# Patient Record
Sex: FEMALE | Race: WHITE | NOT HISPANIC OR LATINO | URBAN - METROPOLITAN AREA
[De-identification: names, ages, dates, MRNs, and addresses within clinical notes are randomized per-mention and may not be internally consistent; named-entity substitution may affect disease eponyms.]

---

## 2018-12-25 ENCOUNTER — INPATIENT (INPATIENT)
Facility: HOSPITAL | Age: 83
LOS: 2 days | Discharge: HOME | DRG: 554 | End: 2018-12-28
Attending: INTERNAL MEDICINE | Admitting: INTERNAL MEDICINE
Payer: MEDICARE

## 2018-12-25 VITALS
HEIGHT: 59 IN | RESPIRATION RATE: 18 BRPM | TEMPERATURE: 98 F | OXYGEN SATURATION: 98 % | SYSTOLIC BLOOD PRESSURE: 107 MMHG | DIASTOLIC BLOOD PRESSURE: 72 MMHG | WEIGHT: 115.08 LBS | HEART RATE: 83 BPM

## 2018-12-25 DIAGNOSIS — R53.1 WEAKNESS: ICD-10-CM

## 2018-12-25 LAB
ALBUMIN SERPL ELPH-MCNC: 4.2 G/DL — SIGNIFICANT CHANGE UP (ref 3.3–5.2)
ALP SERPL-CCNC: 86 U/L — SIGNIFICANT CHANGE UP (ref 40–120)
ALT FLD-CCNC: 16 U/L — SIGNIFICANT CHANGE UP
ANION GAP SERPL CALC-SCNC: 13 MMOL/L — SIGNIFICANT CHANGE UP (ref 5–17)
APPEARANCE UR: ABNORMAL
APTT BLD: 25.1 SEC — LOW (ref 27.5–36.3)
AST SERPL-CCNC: 22 U/L — SIGNIFICANT CHANGE UP
BACTERIA # UR AUTO: ABNORMAL
BASOPHILS # BLD AUTO: 0 K/UL — SIGNIFICANT CHANGE UP (ref 0–0.2)
BASOPHILS NFR BLD AUTO: 0.2 % — SIGNIFICANT CHANGE UP (ref 0–2)
BILIRUB SERPL-MCNC: 0.3 MG/DL — LOW (ref 0.4–2)
BILIRUB UR-MCNC: NEGATIVE — SIGNIFICANT CHANGE UP
BLD GP AB SCN SERPL QL: SIGNIFICANT CHANGE UP
BUN SERPL-MCNC: 28 MG/DL — HIGH (ref 8–20)
CALCIUM SERPL-MCNC: 9.3 MG/DL — SIGNIFICANT CHANGE UP (ref 8.6–10.2)
CHLORIDE SERPL-SCNC: 104 MMOL/L — SIGNIFICANT CHANGE UP (ref 98–107)
CO2 SERPL-SCNC: 24 MMOL/L — SIGNIFICANT CHANGE UP (ref 22–29)
COLOR SPEC: YELLOW — SIGNIFICANT CHANGE UP
CREAT SERPL-MCNC: 0.64 MG/DL — SIGNIFICANT CHANGE UP (ref 0.5–1.3)
DIFF PNL FLD: ABNORMAL
EOSINOPHIL # BLD AUTO: 0.1 K/UL — SIGNIFICANT CHANGE UP (ref 0–0.5)
EOSINOPHIL NFR BLD AUTO: 1.5 % — SIGNIFICANT CHANGE UP (ref 0–6)
EPI CELLS # UR: ABNORMAL
GLUCOSE SERPL-MCNC: 108 MG/DL — SIGNIFICANT CHANGE UP (ref 70–115)
GLUCOSE UR QL: NEGATIVE MG/DL — SIGNIFICANT CHANGE UP
HCT VFR BLD CALC: 36.8 % — LOW (ref 37–47)
HGB BLD-MCNC: 11.9 G/DL — LOW (ref 12–16)
INR BLD: 0.93 RATIO — SIGNIFICANT CHANGE UP (ref 0.88–1.16)
KETONES UR-MCNC: NEGATIVE — SIGNIFICANT CHANGE UP
LEUKOCYTE ESTERASE UR-ACNC: ABNORMAL
LYMPHOCYTES # BLD AUTO: 1.8 K/UL — SIGNIFICANT CHANGE UP (ref 1–4.8)
LYMPHOCYTES # BLD AUTO: 26.8 % — SIGNIFICANT CHANGE UP (ref 20–55)
MCHC RBC-ENTMCNC: 30.3 PG — SIGNIFICANT CHANGE UP (ref 27–31)
MCHC RBC-ENTMCNC: 32.3 G/DL — SIGNIFICANT CHANGE UP (ref 32–36)
MCV RBC AUTO: 93.6 FL — SIGNIFICANT CHANGE UP (ref 81–99)
MONOCYTES # BLD AUTO: 0.7 K/UL — SIGNIFICANT CHANGE UP (ref 0–0.8)
MONOCYTES NFR BLD AUTO: 11 % — HIGH (ref 3–10)
NEUTROPHILS # BLD AUTO: 4 K/UL — SIGNIFICANT CHANGE UP (ref 1.8–8)
NEUTROPHILS NFR BLD AUTO: 60.3 % — SIGNIFICANT CHANGE UP (ref 37–73)
NITRITE UR-MCNC: NEGATIVE — SIGNIFICANT CHANGE UP
PH UR: 7 — SIGNIFICANT CHANGE UP (ref 5–8)
PLATELET # BLD AUTO: 320 K/UL — SIGNIFICANT CHANGE UP (ref 150–400)
POTASSIUM SERPL-MCNC: 3.8 MMOL/L — SIGNIFICANT CHANGE UP (ref 3.5–5.3)
POTASSIUM SERPL-SCNC: 3.8 MMOL/L — SIGNIFICANT CHANGE UP (ref 3.5–5.3)
PROT SERPL-MCNC: 7.2 G/DL — SIGNIFICANT CHANGE UP (ref 6.6–8.7)
PROT UR-MCNC: 15 MG/DL
PROTHROM AB SERPL-ACNC: 10.7 SEC — SIGNIFICANT CHANGE UP (ref 10–12.9)
RBC # BLD: 3.93 M/UL — LOW (ref 4.4–5.2)
RBC # FLD: 13.5 % — SIGNIFICANT CHANGE UP (ref 11–15.6)
RBC CASTS # UR COMP ASSIST: SIGNIFICANT CHANGE UP /HPF (ref 0–4)
SODIUM SERPL-SCNC: 141 MMOL/L — SIGNIFICANT CHANGE UP (ref 135–145)
SP GR SPEC: 1.01 — SIGNIFICANT CHANGE UP (ref 1.01–1.02)
TROPONIN T SERPL-MCNC: <0.01 NG/ML — SIGNIFICANT CHANGE UP (ref 0–0.06)
TYPE + AB SCN PNL BLD: SIGNIFICANT CHANGE UP
UROBILINOGEN FLD QL: NEGATIVE MG/DL — SIGNIFICANT CHANGE UP
WBC # BLD: 6.6 K/UL — SIGNIFICANT CHANGE UP (ref 4.8–10.8)
WBC # FLD AUTO: 6.6 K/UL — SIGNIFICANT CHANGE UP (ref 4.8–10.8)
WBC UR QL: SIGNIFICANT CHANGE UP

## 2018-12-25 PROCEDURE — 12013 RPR F/E/E/N/L/M 2.6-5.0 CM: CPT

## 2018-12-25 PROCEDURE — 73521 X-RAY EXAM HIPS BI 2 VIEWS: CPT | Mod: 26

## 2018-12-25 PROCEDURE — 99222 1ST HOSP IP/OBS MODERATE 55: CPT

## 2018-12-25 PROCEDURE — 73564 X-RAY EXAM KNEE 4 OR MORE: CPT | Mod: 26,50

## 2018-12-25 PROCEDURE — 70450 CT HEAD/BRAIN W/O DYE: CPT | Mod: 26

## 2018-12-25 PROCEDURE — 99285 EMERGENCY DEPT VISIT HI MDM: CPT | Mod: 25

## 2018-12-25 PROCEDURE — 71045 X-RAY EXAM CHEST 1 VIEW: CPT | Mod: 26

## 2018-12-25 PROCEDURE — 73552 X-RAY EXAM OF FEMUR 2/>: CPT | Mod: 26,50

## 2018-12-25 PROCEDURE — 72125 CT NECK SPINE W/O DYE: CPT | Mod: 26

## 2018-12-25 RX ORDER — ACETAMINOPHEN 500 MG
1000 TABLET ORAL ONCE
Qty: 0 | Refills: 0 | Status: COMPLETED | OUTPATIENT
Start: 2018-12-25 | End: 2018-12-25

## 2018-12-25 RX ORDER — LOSARTAN POTASSIUM 100 MG/1
100 TABLET, FILM COATED ORAL DAILY
Qty: 0 | Refills: 0 | Status: DISCONTINUED | OUTPATIENT
Start: 2018-12-25 | End: 2018-12-28

## 2018-12-25 RX ORDER — MORPHINE SULFATE 50 MG/1
2 CAPSULE, EXTENDED RELEASE ORAL ONCE
Qty: 0 | Refills: 0 | Status: DISCONTINUED | OUTPATIENT
Start: 2018-12-25 | End: 2018-12-25

## 2018-12-25 RX ORDER — ATORVASTATIN CALCIUM 80 MG/1
10 TABLET, FILM COATED ORAL AT BEDTIME
Qty: 0 | Refills: 0 | Status: DISCONTINUED | OUTPATIENT
Start: 2018-12-25 | End: 2018-12-26

## 2018-12-25 RX ORDER — ACETAMINOPHEN 500 MG
650 TABLET ORAL EVERY 6 HOURS
Qty: 0 | Refills: 0 | Status: DISCONTINUED | OUTPATIENT
Start: 2018-12-25 | End: 2018-12-26

## 2018-12-25 RX ORDER — ASPIRIN/CALCIUM CARB/MAGNESIUM 324 MG
81 TABLET ORAL DAILY
Qty: 0 | Refills: 0 | Status: DISCONTINUED | OUTPATIENT
Start: 2018-12-25 | End: 2018-12-28

## 2018-12-25 RX ORDER — ENOXAPARIN SODIUM 100 MG/ML
40 INJECTION SUBCUTANEOUS EVERY 24 HOURS
Qty: 0 | Refills: 0 | Status: DISCONTINUED | OUTPATIENT
Start: 2018-12-25 | End: 2018-12-28

## 2018-12-25 RX ADMIN — MORPHINE SULFATE 2 MILLIGRAM(S): 50 CAPSULE, EXTENDED RELEASE ORAL at 22:00

## 2018-12-25 RX ADMIN — ATORVASTATIN CALCIUM 10 MILLIGRAM(S): 80 TABLET, FILM COATED ORAL at 21:28

## 2018-12-25 RX ADMIN — MORPHINE SULFATE 2 MILLIGRAM(S): 50 CAPSULE, EXTENDED RELEASE ORAL at 13:40

## 2018-12-25 RX ADMIN — Medication 81 MILLIGRAM(S): at 17:45

## 2018-12-25 RX ADMIN — Medication 400 MILLIGRAM(S): at 20:08

## 2018-12-25 RX ADMIN — Medication 400 MILLIGRAM(S): at 08:28

## 2018-12-25 RX ADMIN — MORPHINE SULFATE 2 MILLIGRAM(S): 50 CAPSULE, EXTENDED RELEASE ORAL at 21:27

## 2018-12-25 RX ADMIN — LOSARTAN POTASSIUM 100 MILLIGRAM(S): 100 TABLET, FILM COATED ORAL at 17:45

## 2018-12-25 RX ADMIN — Medication 1000 MILLIGRAM(S): at 13:36

## 2018-12-25 RX ADMIN — MORPHINE SULFATE 2 MILLIGRAM(S): 50 CAPSULE, EXTENDED RELEASE ORAL at 07:02

## 2018-12-25 RX ADMIN — MORPHINE SULFATE 2 MILLIGRAM(S): 50 CAPSULE, EXTENDED RELEASE ORAL at 17:45

## 2018-12-25 RX ADMIN — Medication 1000 MILLIGRAM(S): at 08:58

## 2018-12-25 RX ADMIN — MORPHINE SULFATE 2 MILLIGRAM(S): 50 CAPSULE, EXTENDED RELEASE ORAL at 16:02

## 2018-12-25 RX ADMIN — MORPHINE SULFATE 2 MILLIGRAM(S): 50 CAPSULE, EXTENDED RELEASE ORAL at 07:25

## 2018-12-25 RX ADMIN — ENOXAPARIN SODIUM 40 MILLIGRAM(S): 100 INJECTION SUBCUTANEOUS at 17:45

## 2018-12-25 RX ADMIN — MORPHINE SULFATE 2 MILLIGRAM(S): 50 CAPSULE, EXTENDED RELEASE ORAL at 18:18

## 2018-12-25 NOTE — ED PROVIDER NOTE - PROGRESS NOTE DETAILS
Family at bedside requesting no narcotics at this time. Received patient signout from Dr. Tyson.  Patient stated to be sleeping in recliner but then screamed out and found on ground laceration to head.  CT negative.  Pending labs and laceration repair. Family requesting that plastics repairs the laceration.  Plastics paid Family feels that patient is not safe to be at home due to frequent falls.  They agree with patient going to rehab.  PT consulted and social work made aware. Patient with confusion and weakness.  Family feels that patient is not safe to be at home due to frequent falls.

## 2018-12-25 NOTE — ED ADULT NURSE NOTE - NSIMPLEMENTINTERV_GEN_ALL_ED
Implemented All Fall with Harm Risk Interventions:  [place] to call system. Call bell, personal items and telephone within reach. Instruct patient to call for assistance. Room bathroom lighting operational. Non-slip footwear when patient is off stretcher. Physically safe environment: no spills, clutter or unnecessary equipment. Stretcher in lowest position, wheels locked, appropriate side rails in place. Provide visual cue, wrist band, yellow gown, etc. Monitor gait and stability. Monitor for mental status changes and reorient to person, place, and time. Review medications for side effects contributing to fall risk. Reinforce activity limits and safety measures with patient and family. Provide visual clues: red socks.

## 2018-12-25 NOTE — ED PROVIDER NOTE - MEDICAL DECISION MAKING DETAILS
89yoF with Paget's dz, chronic pain to b/l hips/ knees, pw groin pain , forehead laceration s/p fall ? unknown circumstance. Plan to check CTh/ c spine, xrays of hips/ knees,  control pain, check labs, and reevaluate.

## 2018-12-25 NOTE — H&P ADULT - HISTORY OF PRESENT ILLNESS
The patient is a 89 year old female with a history of dementia, hypertension, hyperlipidemia and Paget's disease who was brought to the ER by her family after a fall at home. According to the patient's daughter she found her laying on the floor unclear of what happened or how she fell. She ambulates at baseline with a walker. She has been progressively more confused, weak and falling more often in the past few weeks. In the ER, ct head and c spine were negative. X-ray of bilateral hips, femur and knees were negative for fracture. Chest x-ray and UA negative for infection. Evaluated by PT, admitted for placement to [facility].

## 2018-12-25 NOTE — CHART NOTE - NSCHARTNOTEFT_GEN_A_CORE
Patient referred by MD.  Patient came to ED after a fall at home.  I met with patient and  her daughter Bren Lion [phone].  Daughter reports that patient uses a walker to assist with ambulation.  Patient has been falling, last fall was about 1 week ago and daughter reports patient has been more confused.  On this ED visit,  patient had to get stitches to her forehead after the fall at home.  According to daughter, there are 10 stairs to entrance of the house, and 10 stairs inside to bedroom bath and kitchen.  Daughter is having difficulty managing patient and doesn't feel she can take patient home as patient is not able to ambulate.   Patient was seen by PT and they are recommending subacute rehab..  I have discussed with the family the subacute process, including medicare guidelines for the 3 day medical hospital stay.  I explained the subacute process and provided her with the list of [facility] facilities. Daughter's preference is plan is subacute is [facility].  I discussed long term placement with daughter and at this time she does not want to pursue long term placement. Emotional support given. Case discussed with Dr. Temple, she will be ordering lab work to check for a UTI.  Await lab work results.   I spoke to the discharge planner Stacey, she completed the LAURENCE.  Case discussed with  Ann Fonseca.

## 2018-12-25 NOTE — PROVIDER CONTACT NOTE (OTHER) - BACKGROUND
Pt living with daughter, ambulatory with the use of a RW. Daughter reporting steps to enter the home.

## 2018-12-25 NOTE — PHYSICAL THERAPY INITIAL EVALUATION ADULT - ADDITIONAL COMMENTS
per patient and daughter, pt was ambulating at home with a RW however has been falling more frequently. Pt ok to be seen per RN, no WB restrictions.

## 2018-12-25 NOTE — PROVIDER CONTACT NOTE (OTHER) - ASSESSMENT
Pt demonstrates therex, bed mobs, transfers with mod assist from PT. Pt able to demonstrate bed side gait assessment, requires mod assist from PT due to generalized weakness, decreased balance and reports of left knee/hip pain.

## 2018-12-25 NOTE — ED PROVIDER NOTE - CARE PLAN
Principal Discharge DX:	Weakness  Secondary Diagnosis:	Confusion  Secondary Diagnosis:	Falls Principal Discharge DX:	Weakness  Secondary Diagnosis:	Confusion  Secondary Diagnosis:	Falls  Secondary Diagnosis:	Forehead laceration

## 2018-12-25 NOTE — PHYSICAL THERAPY INITIAL EVALUATION ADULT - PERTINENT HX OF CURRENT PROBLEM, REHAB EVAL
Pt with hx of Paget's disease presents to [facility] with reports of left hip/knee pain s/p fall.

## 2018-12-25 NOTE — ED PROVIDER NOTE - OBJECTIVE STATEMENT
89yoF with Paget's , arthritis, chronic groin and knee pain, brought in by family after they found her on the floor; she sleeps in a recliner;  They report that pt screamed out and they found her on the ground. Pt unsure what happened. Family reports that pt chronically c/o groin and knee pain, has been back and forth to the orthopedist and has had multiple intraarticular injections to the knee, which seemed to have helped a bit. Pt at baseline ambulates w/ walker. Pt denies any headache/ neck pain/ chest pain/ SOB.  h/o lumbar spinal surgery     PMD: Natali

## 2018-12-25 NOTE — ED ADULT NURSE REASSESSMENT NOTE - NS ED NURSE REASSESS COMMENT FT1
Patient off unit at time RN assigned, unable to perform PE. Will eval once patient returns to unit.
Pt c/o she had to use bathroom, pt placed on bedpan, tolerated well.  Pt request adult diaper for comfort measures incase she is unable to hold urine while waiting for assistance,  diaper placed on pt. Pt remains awake and alert.
Pt sleeping in stretcher, respirations even & unlabored Pt easy to wake. Pt vitals WNL, waiting for physical therapy evaluation. pt and family aware of plan of care.
Assumed pt care at this time. Pt awake, c/o bilateral knee pain, pt waiting for CT scan results, family at bedside with pt. Pt and family aware of plan of care.
Pt assisted to the bathroom in wheelchair, pt tolerated well.

## 2018-12-25 NOTE — ED PROVIDER NOTE - LOCATION
knee/Mild TTP bilateral hips ;  R medial knee TTP; good ROM b/l hips/ knees.  mild erythema to medial R knee.

## 2018-12-25 NOTE — PROVIDER CONTACT NOTE (OTHER) - SITUATION
Pt presents to [facility] to s/p reoccurrence of falls at home. Pt reporting left LE pain hip/knee.

## 2018-12-25 NOTE — ED ADULT NURSE NOTE - NSFALLRSKPSTHSTOCCUR_ED_ALL_ED
Normal vision: sees adequately in most situations; can see medication labels, newsprint
Single Mechanical/Accidental Fall

## 2018-12-26 PROBLEM — Z00.00 ENCOUNTER FOR PREVENTIVE HEALTH EXAMINATION: Status: ACTIVE | Noted: 2018-12-26

## 2018-12-26 LAB
24R-OH-CALCIDIOL SERPL-MCNC: 17.2 NG/ML — LOW (ref 30–80)
ANION GAP SERPL CALC-SCNC: 14 MMOL/L — SIGNIFICANT CHANGE UP (ref 5–17)
BUN SERPL-MCNC: 24 MG/DL — HIGH (ref 8–20)
CALCIUM SERPL-MCNC: 8.9 MG/DL — SIGNIFICANT CHANGE UP (ref 8.6–10.2)
CHLORIDE SERPL-SCNC: 100 MMOL/L — SIGNIFICANT CHANGE UP (ref 98–107)
CO2 SERPL-SCNC: 25 MMOL/L — SIGNIFICANT CHANGE UP (ref 22–29)
CREAT SERPL-MCNC: 0.68 MG/DL — SIGNIFICANT CHANGE UP (ref 0.5–1.3)
GLUCOSE SERPL-MCNC: 175 MG/DL — HIGH (ref 70–115)
HCT VFR BLD CALC: 37.1 % — SIGNIFICANT CHANGE UP (ref 37–47)
HGB BLD-MCNC: 11.8 G/DL — LOW (ref 12–16)
MCHC RBC-ENTMCNC: 29.9 PG — SIGNIFICANT CHANGE UP (ref 27–31)
MCHC RBC-ENTMCNC: 31.8 G/DL — LOW (ref 32–36)
MCV RBC AUTO: 94.2 FL — SIGNIFICANT CHANGE UP (ref 81–99)
PLATELET # BLD AUTO: 309 K/UL — SIGNIFICANT CHANGE UP (ref 150–400)
POTASSIUM SERPL-MCNC: 3.6 MMOL/L — SIGNIFICANT CHANGE UP (ref 3.5–5.3)
POTASSIUM SERPL-SCNC: 3.6 MMOL/L — SIGNIFICANT CHANGE UP (ref 3.5–5.3)
RBC # BLD: 3.94 M/UL — LOW (ref 4.4–5.2)
RBC # FLD: 13.7 % — SIGNIFICANT CHANGE UP (ref 11–15.6)
SODIUM SERPL-SCNC: 139 MMOL/L — SIGNIFICANT CHANGE UP (ref 135–145)
TSH SERPL-MCNC: 1.79 UIU/ML — SIGNIFICANT CHANGE UP (ref 0.27–4.2)
VIT B12 SERPL-MCNC: 780 PG/ML — SIGNIFICANT CHANGE UP (ref 232–1245)
WBC # BLD: 7.3 K/UL — SIGNIFICANT CHANGE UP (ref 4.8–10.8)
WBC # FLD AUTO: 7.3 K/UL — SIGNIFICANT CHANGE UP (ref 4.8–10.8)

## 2018-12-26 PROCEDURE — 99232 SBSQ HOSP IP/OBS MODERATE 35: CPT

## 2018-12-26 RX ORDER — ASPIRIN/CALCIUM CARB/MAGNESIUM 324 MG
1 TABLET ORAL
Qty: 0 | Refills: 0 | COMMUNITY

## 2018-12-26 RX ORDER — LIDOCAINE 4 G/100G
2 CREAM TOPICAL DAILY
Qty: 0 | Refills: 0 | Status: DISCONTINUED | OUTPATIENT
Start: 2018-12-26 | End: 2018-12-28

## 2018-12-26 RX ORDER — NYSTATIN CREAM 100000 [USP'U]/G
1 CREAM TOPICAL
Qty: 0 | Refills: 0 | Status: DISCONTINUED | OUTPATIENT
Start: 2018-12-26 | End: 2018-12-28

## 2018-12-26 RX ORDER — ACETAMINOPHEN 500 MG
650 TABLET ORAL EVERY 6 HOURS
Qty: 0 | Refills: 0 | Status: DISCONTINUED | OUTPATIENT
Start: 2018-12-26 | End: 2018-12-28

## 2018-12-26 RX ADMIN — Medication 650 MILLIGRAM(S): at 23:22

## 2018-12-26 RX ADMIN — Medication 650 MILLIGRAM(S): at 13:21

## 2018-12-26 RX ADMIN — ENOXAPARIN SODIUM 40 MILLIGRAM(S): 100 INJECTION SUBCUTANEOUS at 17:14

## 2018-12-26 RX ADMIN — LOSARTAN POTASSIUM 100 MILLIGRAM(S): 100 TABLET, FILM COATED ORAL at 06:36

## 2018-12-26 RX ADMIN — Medication 10 MILLIGRAM(S): at 22:26

## 2018-12-26 RX ADMIN — Medication 81 MILLIGRAM(S): at 22:27

## 2018-12-26 RX ADMIN — Medication 650 MILLIGRAM(S): at 12:31

## 2018-12-27 LAB
ANION GAP SERPL CALC-SCNC: 13 MMOL/L — SIGNIFICANT CHANGE UP (ref 5–17)
BUN SERPL-MCNC: 33 MG/DL — HIGH (ref 8–20)
CALCIUM SERPL-MCNC: 8.7 MG/DL — SIGNIFICANT CHANGE UP (ref 8.6–10.2)
CHLORIDE SERPL-SCNC: 102 MMOL/L — SIGNIFICANT CHANGE UP (ref 98–107)
CO2 SERPL-SCNC: 23 MMOL/L — SIGNIFICANT CHANGE UP (ref 22–29)
CREAT SERPL-MCNC: 0.74 MG/DL — SIGNIFICANT CHANGE UP (ref 0.5–1.3)
GLUCOSE SERPL-MCNC: 115 MG/DL — SIGNIFICANT CHANGE UP (ref 70–115)
HCT VFR BLD CALC: 36.2 % — LOW (ref 37–47)
HGB BLD-MCNC: 11.8 G/DL — LOW (ref 12–16)
MCHC RBC-ENTMCNC: 30.3 PG — SIGNIFICANT CHANGE UP (ref 27–31)
MCHC RBC-ENTMCNC: 32.6 G/DL — SIGNIFICANT CHANGE UP (ref 32–36)
MCV RBC AUTO: 93.1 FL — SIGNIFICANT CHANGE UP (ref 81–99)
PLATELET # BLD AUTO: 311 K/UL — SIGNIFICANT CHANGE UP (ref 150–400)
POTASSIUM SERPL-MCNC: 4 MMOL/L — SIGNIFICANT CHANGE UP (ref 3.5–5.3)
POTASSIUM SERPL-SCNC: 4 MMOL/L — SIGNIFICANT CHANGE UP (ref 3.5–5.3)
RBC # BLD: 3.89 M/UL — LOW (ref 4.4–5.2)
RBC # FLD: 13.8 % — SIGNIFICANT CHANGE UP (ref 11–15.6)
SODIUM SERPL-SCNC: 138 MMOL/L — SIGNIFICANT CHANGE UP (ref 135–145)
TSH SERPL-MCNC: 1.73 UIU/ML — SIGNIFICANT CHANGE UP (ref 0.27–4.2)
VIT B12 SERPL-MCNC: 765 PG/ML — SIGNIFICANT CHANGE UP (ref 232–1245)
WBC # BLD: 8.6 K/UL — SIGNIFICANT CHANGE UP (ref 4.8–10.8)
WBC # FLD AUTO: 8.6 K/UL — SIGNIFICANT CHANGE UP (ref 4.8–10.8)

## 2018-12-27 PROCEDURE — 93010 ELECTROCARDIOGRAM REPORT: CPT

## 2018-12-27 PROCEDURE — 99232 SBSQ HOSP IP/OBS MODERATE 35: CPT

## 2018-12-27 RX ORDER — CELECOXIB 200 MG/1
100 CAPSULE ORAL AT BEDTIME
Qty: 0 | Refills: 0 | Status: DISCONTINUED | OUTPATIENT
Start: 2018-12-27 | End: 2018-12-28

## 2018-12-27 RX ORDER — BACITRACIN ZINC 500 UNIT/G
1 OINTMENT IN PACKET (EA) TOPICAL DAILY
Qty: 0 | Refills: 0 | Status: DISCONTINUED | OUTPATIENT
Start: 2018-12-27 | End: 2018-12-28

## 2018-12-27 RX ORDER — LANOLIN ALCOHOL/MO/W.PET/CERES
3 CREAM (GRAM) TOPICAL AT BEDTIME
Qty: 0 | Refills: 0 | Status: DISCONTINUED | OUTPATIENT
Start: 2018-12-27 | End: 2018-12-28

## 2018-12-27 RX ORDER — SODIUM CHLORIDE 9 MG/ML
500 INJECTION INTRAMUSCULAR; INTRAVENOUS; SUBCUTANEOUS
Qty: 0 | Refills: 0 | Status: COMPLETED | OUTPATIENT
Start: 2018-12-27 | End: 2018-12-27

## 2018-12-27 RX ORDER — HALOPERIDOL DECANOATE 100 MG/ML
1 INJECTION INTRAMUSCULAR ONCE
Qty: 0 | Refills: 0 | Status: COMPLETED | OUTPATIENT
Start: 2018-12-27 | End: 2018-12-27

## 2018-12-27 RX ORDER — CHOLECALCIFEROL (VITAMIN D3) 125 MCG
1000 CAPSULE ORAL DAILY
Qty: 0 | Refills: 0 | Status: DISCONTINUED | OUTPATIENT
Start: 2018-12-27 | End: 2018-12-28

## 2018-12-27 RX ADMIN — Medication 650 MILLIGRAM(S): at 07:22

## 2018-12-27 RX ADMIN — Medication 10 MILLIGRAM(S): at 23:06

## 2018-12-27 RX ADMIN — Medication 500 MILLIGRAM(S): at 18:50

## 2018-12-27 RX ADMIN — Medication 81 MILLIGRAM(S): at 14:25

## 2018-12-27 RX ADMIN — Medication 1 APPLICATION(S): at 14:25

## 2018-12-27 RX ADMIN — Medication 650 MILLIGRAM(S): at 06:42

## 2018-12-27 RX ADMIN — Medication 3 MILLIGRAM(S): at 23:06

## 2018-12-27 RX ADMIN — NYSTATIN CREAM 1 APPLICATION(S): 100000 CREAM TOPICAL at 17:50

## 2018-12-27 RX ADMIN — LIDOCAINE 2 PATCH: 4 CREAM TOPICAL at 14:22

## 2018-12-27 RX ADMIN — LIDOCAINE 2 PATCH: 4 CREAM TOPICAL at 08:50

## 2018-12-27 RX ADMIN — Medication 500 MILLIGRAM(S): at 17:50

## 2018-12-27 RX ADMIN — SODIUM CHLORIDE 60 MILLILITER(S): 9 INJECTION INTRAMUSCULAR; INTRAVENOUS; SUBCUTANEOUS at 17:54

## 2018-12-27 RX ADMIN — NYSTATIN CREAM 1 APPLICATION(S): 100000 CREAM TOPICAL at 06:23

## 2018-12-27 RX ADMIN — CELECOXIB 100 MILLIGRAM(S): 200 CAPSULE ORAL at 23:06

## 2018-12-27 RX ADMIN — LOSARTAN POTASSIUM 100 MILLIGRAM(S): 100 TABLET, FILM COATED ORAL at 06:23

## 2018-12-27 RX ADMIN — ENOXAPARIN SODIUM 40 MILLIGRAM(S): 100 INJECTION SUBCUTANEOUS at 18:02

## 2018-12-27 RX ADMIN — Medication 650 MILLIGRAM(S): at 00:38

## 2018-12-27 RX ADMIN — Medication 1000 UNIT(S): at 14:25

## 2018-12-27 RX ADMIN — Medication 650 MILLIGRAM(S): at 14:24

## 2018-12-27 RX ADMIN — Medication 650 MILLIGRAM(S): at 15:24

## 2018-12-27 RX ADMIN — LIDOCAINE 2 PATCH: 4 CREAM TOPICAL at 06:49

## 2018-12-27 RX ADMIN — HALOPERIDOL DECANOATE 1 MILLIGRAM(S): 100 INJECTION INTRAMUSCULAR at 22:00

## 2018-12-27 NOTE — PROVIDER CONTACT NOTE (OTHER) - ACTION/TREATMENT ORDERED:
md aware stat ekg ordered and done.
Pt will be followed while in H. 2-3wk, 1-2wk. PT goal, stand by assist with bed mobs, transfers, amb w/ RW 75ft

## 2018-12-27 NOTE — CHART NOTE - NSCHARTNOTEFT_GEN_A_CORE
Medicine PA- Cd. for pt. with agitation, trying to get out of bed, impulsive despite redirecting. Pt. admitted s/p fall and has dementia, was on 1:1 but was D/Cd this morning pending PATRICK placement. Daughter at bedside, doesn't want pt. on sedatives, ok to try haldol. VSS- 126/63- 18- 103- 98.2- 95% Pt. alert, oriented to self, talks happily about her age, but confused in general. Haldol 1mg IM given w/very little effect, still tries to get OOB, fall risk. Needs 1:1 for safety tonight. Can reevaluate in morning, sooner PRN.

## 2018-12-28 ENCOUNTER — TRANSCRIPTION ENCOUNTER (OUTPATIENT)
Age: 83
End: 2018-12-28

## 2018-12-28 VITALS
OXYGEN SATURATION: 97 % | SYSTOLIC BLOOD PRESSURE: 125 MMHG | HEART RATE: 90 BPM | RESPIRATION RATE: 18 BRPM | TEMPERATURE: 99 F | DIASTOLIC BLOOD PRESSURE: 78 MMHG

## 2018-12-28 PROCEDURE — 99285 EMERGENCY DEPT VISIT HI MDM: CPT | Mod: 25

## 2018-12-28 PROCEDURE — 72125 CT NECK SPINE W/O DYE: CPT

## 2018-12-28 PROCEDURE — 96376 TX/PRO/DX INJ SAME DRUG ADON: CPT | Mod: XU

## 2018-12-28 PROCEDURE — 80048 BASIC METABOLIC PNL TOTAL CA: CPT

## 2018-12-28 PROCEDURE — 86901 BLOOD TYPING SEROLOGIC RH(D): CPT

## 2018-12-28 PROCEDURE — 84443 ASSAY THYROID STIM HORMONE: CPT

## 2018-12-28 PROCEDURE — 85610 PROTHROMBIN TIME: CPT

## 2018-12-28 PROCEDURE — 73521 X-RAY EXAM HIPS BI 2 VIEWS: CPT

## 2018-12-28 PROCEDURE — 70450 CT HEAD/BRAIN W/O DYE: CPT

## 2018-12-28 PROCEDURE — 82607 VITAMIN B-12: CPT

## 2018-12-28 PROCEDURE — 71045 X-RAY EXAM CHEST 1 VIEW: CPT

## 2018-12-28 PROCEDURE — 84484 ASSAY OF TROPONIN QUANT: CPT

## 2018-12-28 PROCEDURE — 81001 URINALYSIS AUTO W/SCOPE: CPT

## 2018-12-28 PROCEDURE — 85027 COMPLETE CBC AUTOMATED: CPT

## 2018-12-28 PROCEDURE — 93005 ELECTROCARDIOGRAM TRACING: CPT

## 2018-12-28 PROCEDURE — 12013 RPR F/E/E/N/L/M 2.6-5.0 CM: CPT

## 2018-12-28 PROCEDURE — 86850 RBC ANTIBODY SCREEN: CPT

## 2018-12-28 PROCEDURE — 82306 VITAMIN D 25 HYDROXY: CPT

## 2018-12-28 PROCEDURE — 85730 THROMBOPLASTIN TIME PARTIAL: CPT

## 2018-12-28 PROCEDURE — 36415 COLL VENOUS BLD VENIPUNCTURE: CPT

## 2018-12-28 PROCEDURE — 96375 TX/PRO/DX INJ NEW DRUG ADDON: CPT | Mod: XU

## 2018-12-28 PROCEDURE — 99239 HOSP IP/OBS DSCHRG MGMT >30: CPT

## 2018-12-28 PROCEDURE — 80053 COMPREHEN METABOLIC PANEL: CPT

## 2018-12-28 PROCEDURE — 73564 X-RAY EXAM KNEE 4 OR MORE: CPT

## 2018-12-28 PROCEDURE — 73552 X-RAY EXAM OF FEMUR 2/>: CPT

## 2018-12-28 PROCEDURE — 96365 THER/PROPH/DIAG IV INF INIT: CPT | Mod: XU

## 2018-12-28 PROCEDURE — 86900 BLOOD TYPING SEROLOGIC ABO: CPT

## 2018-12-28 RX ORDER — CHOLECALCIFEROL (VITAMIN D3) 125 MCG
1000 CAPSULE ORAL
Qty: 0 | Refills: 0 | COMMUNITY
Start: 2018-12-28

## 2018-12-28 RX ORDER — RISPERIDONE 4 MG/1
1 TABLET ORAL
Qty: 0 | Refills: 0 | COMMUNITY
Start: 2018-12-28

## 2018-12-28 RX ORDER — BACITRACIN ZINC 500 UNIT/G
1 OINTMENT IN PACKET (EA) TOPICAL
Qty: 0 | Refills: 0 | COMMUNITY
Start: 2018-12-28

## 2018-12-28 RX ORDER — LOSARTAN/HYDROCHLOROTHIAZIDE 100MG-25MG
1 TABLET ORAL
Qty: 0 | Refills: 0 | COMMUNITY

## 2018-12-28 RX ORDER — HALOPERIDOL DECANOATE 100 MG/ML
2 INJECTION INTRAMUSCULAR EVERY 8 HOURS
Qty: 0 | Refills: 0 | Status: DISCONTINUED | OUTPATIENT
Start: 2018-12-28 | End: 2018-12-28

## 2018-12-28 RX ORDER — ACETAMINOPHEN 500 MG
2 TABLET ORAL
Qty: 0 | Refills: 0 | COMMUNITY
Start: 2018-12-28

## 2018-12-28 RX ORDER — LANOLIN ALCOHOL/MO/W.PET/CERES
1 CREAM (GRAM) TOPICAL
Qty: 0 | Refills: 0 | COMMUNITY
Start: 2018-12-28

## 2018-12-28 RX ORDER — RISPERIDONE 4 MG/1
0.25 TABLET ORAL AT BEDTIME
Qty: 0 | Refills: 0 | Status: DISCONTINUED | OUTPATIENT
Start: 2018-12-28 | End: 2018-12-28

## 2018-12-28 RX ORDER — LOSARTAN POTASSIUM 100 MG/1
1 TABLET, FILM COATED ORAL
Qty: 0 | Refills: 0 | COMMUNITY
Start: 2018-12-28

## 2018-12-28 RX ORDER — HALOPERIDOL DECANOATE 100 MG/ML
1 INJECTION INTRAMUSCULAR ONCE
Qty: 0 | Refills: 0 | Status: COMPLETED | OUTPATIENT
Start: 2018-12-28 | End: 2018-12-28

## 2018-12-28 RX ORDER — CELECOXIB 200 MG/1
1 CAPSULE ORAL
Qty: 0 | Refills: 0 | COMMUNITY
Start: 2018-12-28

## 2018-12-28 RX ADMIN — Medication 81 MILLIGRAM(S): at 13:13

## 2018-12-28 RX ADMIN — Medication 1 APPLICATION(S): at 13:13

## 2018-12-28 RX ADMIN — CELECOXIB 100 MILLIGRAM(S): 200 CAPSULE ORAL at 00:06

## 2018-12-28 RX ADMIN — LOSARTAN POTASSIUM 100 MILLIGRAM(S): 100 TABLET, FILM COATED ORAL at 10:01

## 2018-12-28 RX ADMIN — NYSTATIN CREAM 1 APPLICATION(S): 100000 CREAM TOPICAL at 10:00

## 2018-12-28 RX ADMIN — HALOPERIDOL DECANOATE 1 MILLIGRAM(S): 100 INJECTION INTRAMUSCULAR at 04:11

## 2018-12-28 RX ADMIN — Medication 1000 UNIT(S): at 13:13

## 2018-12-28 NOTE — DISCHARGE NOTE ADULT - PLAN OF CARE
healing. remove suture on 12/29  continue prn bacitracin rehab as needed risperidone.  no evidence of infection noted losartan.  stop HCTZ due to risk of dehydration

## 2018-12-28 NOTE — DISCHARGE NOTE ADULT - MEDICATION SUMMARY - MEDICATIONS TO TAKE
I will START or STAY ON the medications listed below when I get home from the hospital:    acetaminophen 325 mg oral tablet  -- 2 tab(s) by mouth every 6 hours, As needed, Temp greater or equal to 38C (100.4F), Mild Pain (1 - 3), Moderate Pain (4 - 6)  -- Indication: For pain    celecoxib 100 mg oral capsule  -- 1 cap(s) by mouth once a day (at bedtime)  -- Indication: For pain    aspirin 81 mg oral tablet  -- 1 tab(s) by mouth once a day  -- Indication: For primary prevention    losartan 100 mg oral tablet  -- 1 tab(s) by mouth once a day  -- Indication: For Hypertension    pravastatin 10 mg oral tablet  -- 1 tab(s) by mouth once a day (at bedtime)  -- Indication: For Hyperlipidemia    risperiDONE 0.25 mg oral tablet  -- 1 tab(s) by mouth once a day (at bedtime), As Needed  -- Indication: For delirium    bacitracin 500 units/g topical ointment  -- 1 application on skin once a day  -- Indication: For Forehead laceration    melatonin 3 mg oral tablet  -- 1 tab(s) by mouth once a day (at bedtime), As needed, Insomnia  -- Indication: For insomnia    cholecalciferol oral tablet  -- 1000 unit(s) by mouth once a day  -- Indication: For vit d deficiency

## 2018-12-28 NOTE — PROGRESS NOTE ADULT - ASSESSMENT
The patient is a 89 year old female with a history of dementia, hypertension, hyperlipidemia and Paget's disease who was brought to the ER by her family after a fall at home. According to the patient's daughter she found her laying on the floor unclear of what happened or how she fell. She ambulates at baseline with a walker. She has been progressively more confused, weak and falling more often in the past few weeks. In the ER, ct head and c spine were negative. Xray of bilaterl hips, femur and knees were negative for fracture. Chest xray and UA negative for infection. Evaluated by PT, admitted for placement to [facility].     Assessment/Plan:    1. Frequent falls secondary to osteoarthritis and gait instability?: Evaluated by PT, will need PATRICK placement  Fall precautions; , family declines medications for sedation    2. Hypertension: Continue losartan. Monitor bp    3. Hyperlipidemia: Continue statin therapy    4. Dementia.  d/c 1:1 for [facility] discharge.  enhanced supervision.    5. Osteoarthritic pain:  celebrex qhs. c/w prn tylenol and patches.  avoid narcotics as can cause more delirium    6. low vit d:  supplement    VTE- lovenox subcut    Plan discussed in detail with the patient's daughter Bren Lion and Son at bedside.
The patient is a 89 year old female with a history of dementia, hypertension, hyperlipidemia and Paget's disease who was brought to the ER by her family after a fall at home. According to the patient's daughter she found her laying on the floor unclear of what happened or how she fell. She ambulates at baseline with a walker. She has been progressively more confused, weak and falling more often in the past few weeks. In the ER, ct head and c spine were negative. Xray of bilaterl hips, femur and knees were negative for fracture. Chest xray and UA negative for infection. Evaluated by PT, admitted for placement to [facility].     Assessment/Plan:    1. Frequent falls secondary to osteoarthritis and gait instability?: Evaluated by PT, will need [facility] placement  Fall precautions;     2. Hypertension: Continue losartan. Monitor bp    3. Hyperlipidemia: Continue statin therapy    4. Dementia with delirium.  d/c 1:1 for [facility] discharge.  enhanced supervision.         family agrees for sedating meds---risperidone qhs and prn haldol        if severe agitation, can use higher dose of haldol and/or restraints.    5. Osteoarthritic pain:  celebrex qhs. c/w prn tylenol and patches.  avoid narcotics as can cause more delirium    6. low vit d:  supplement    VTE- lovenox subcut    Plan discussed in detail with the patient's daughter Bren Lion and Son at bedside.    SW/CM trying for discharge to dementia unit
The patient is a 89 year old female with a history of dementia, hypertension, hyperlipidemia and Paget's disease who was brought to the ER by her family after a fall at home. According to the patient's daughter she found her laying on the floor unclear of what happened or how she fell. She ambulates at baseline with a walker. She has been progressively more confused, weak and falling more often in the past few weeks. In the ER, ct head and c spine were negative. Xray of bilaterl hips, femur and knees were negative for fracture. Chest xray and UA negative for infection. Evaluated by PT, admitted for placement to [facility].     Assessment/Plan:    1. Frequent falls secondary to osteoarthritis and gait instability?: Evaluated by PT, will need PATRICK placement  Fall precautions; 1:1 for safety, family declines medications for sedation    2. Hypertension: Continue losartan. Monitor bp    3. Hyperlipidemia: Continue statin therapy    4. Dementia    VTE- lovenox subcut    Plan discussed in detail with the patient's daughter Bren Lion.  per request, PMD contacted DR Hurst and updated---he agrees with [facility] plan.

## 2018-12-28 NOTE — DISCHARGE NOTE ADULT - PATIENT PORTAL LINK FT
You can access the [facility] Patient Portal, offered by [facility], by registering with the following website: [email or web address]

## 2018-12-28 NOTE — DISCHARGE NOTE ADULT - CARE PLAN
Principal Discharge DX:	Laceration of forehead, initial encounter  Goal:	healing.  Assessment and plan of treatment:	remove suture on 12/29  continue prn bacitracin  Secondary Diagnosis:	Frequent falls  Assessment and plan of treatment:	rehab  Secondary Diagnosis:	Delirium superimposed on dementia  Assessment and plan of treatment:	as needed risperidone.  no evidence of infection noted  Secondary Diagnosis:	Essential hypertension  Assessment and plan of treatment:	losartan.  stop HCTZ due to risk of dehydration  Secondary Diagnosis:	Debility  Assessment and plan of treatment:	rehab

## 2018-12-28 NOTE — CHART NOTE - NSCHARTNOTEFT_GEN_A_CORE
Medicine PA- Cd. @ 1514 for pt. acting out, getting OOB, agitated, pulled IV out, refusing restart. Earlier in shift similar behavior which haldol 1mg IM was given and helped. Ordered a repeat of haldol 1mg IM for pt. and she settled down and rested for a short time. At 0600 pt. began to act out again, this time screaming and walking around room, refusing to listen.  Pt's grandson Micah is present, as well as 1:1 aid and nurse. After much redirecting pt. calmed down and got back in the bed. At present she is resting quietly and comfortably in bed. Had long discussion with grandson about dementia, sundowning and potentially needing to use medication to help the pt. relax. As of now we agreed to allow her to sleep and not wake her as that would further agitate situation. Her meds. are being held back until later a.m., continue w/ 1:1 status, supportive care.

## 2018-12-28 NOTE — DISCHARGE NOTE ADULT - HOSPITAL COURSE
89 year old female with a history of dementia, hypertension, hyperlipidemia and Paget's disease who was brought to the ER by her family after a fall at home. According to the patient's daughter she found her laying on the floor unclear of what happened or how she fell. She ambulates at baseline with a walker. She has been progressively more confused, weak and falling more often in the past few weeks. In the ER, ct head and c spine were negative. Xray of bilaterl hips, femur and knees were negative for fracture. Chest xray and UA negative for infection. Evaluated by PT, admitted for placement to [facility].  Episodes of delirium.    dc planning 35 minutes.

## 2018-12-29 ENCOUNTER — EMERGENCY (EMERGENCY)
Facility: HOSPITAL | Age: 83
LOS: 1 days | Discharge: HOME | End: 2018-12-29
Attending: EMERGENCY MEDICINE
Payer: MEDICARE

## 2018-12-29 VITALS
TEMPERATURE: 98 F | DIASTOLIC BLOOD PRESSURE: 74 MMHG | OXYGEN SATURATION: 99 % | RESPIRATION RATE: 18 BRPM | SYSTOLIC BLOOD PRESSURE: 140 MMHG

## 2018-12-29 VITALS
RESPIRATION RATE: 18 BRPM | TEMPERATURE: 98 F | SYSTOLIC BLOOD PRESSURE: 147 MMHG | HEART RATE: 89 BPM | DIASTOLIC BLOOD PRESSURE: 73 MMHG | OXYGEN SATURATION: 100 %

## 2018-12-29 PROCEDURE — 72125 CT NECK SPINE W/O DYE: CPT

## 2018-12-29 PROCEDURE — 70450 CT HEAD/BRAIN W/O DYE: CPT | Mod: 26

## 2018-12-29 PROCEDURE — 72125 CT NECK SPINE W/O DYE: CPT | Mod: 26

## 2018-12-29 PROCEDURE — 99284 EMERGENCY DEPT VISIT MOD MDM: CPT | Mod: 25

## 2018-12-29 PROCEDURE — 73030 X-RAY EXAM OF SHOULDER: CPT | Mod: 26,LT

## 2018-12-29 PROCEDURE — 73030 X-RAY EXAM OF SHOULDER: CPT

## 2018-12-29 PROCEDURE — 72192 CT PELVIS W/O DYE: CPT | Mod: 26

## 2018-12-29 PROCEDURE — 76377 3D RENDER W/INTRP POSTPROCES: CPT | Mod: 26

## 2018-12-29 PROCEDURE — 99284 EMERGENCY DEPT VISIT MOD MDM: CPT

## 2018-12-29 PROCEDURE — 72192 CT PELVIS W/O DYE: CPT

## 2018-12-29 PROCEDURE — 76377 3D RENDER W/INTRP POSTPROCES: CPT

## 2018-12-29 PROCEDURE — 70450 CT HEAD/BRAIN W/O DYE: CPT

## 2018-12-29 RX ORDER — DEXAMETHASONE 0.5 MG/5ML
10 ELIXIR ORAL ONCE
Qty: 0 | Refills: 0 | Status: DISCONTINUED | OUTPATIENT
Start: 2018-12-29 | End: 2018-12-29

## 2018-12-29 NOTE — ED ADULT NURSE REASSESSMENT NOTE - NS ED NURSE REASSESS COMMENT FT1
Patient VSS, trying to get out of bed, patient put on bedpan-refused to go to the bathroom, patient brought to bathroom by nursing aide.

## 2018-12-29 NOTE — ED ADULT TRIAGE NOTE - CHIEF COMPLAINT QUOTE
pt with hx of dementia d/c from inpatient [facility]  stiches present to forehead with brusing all stages as per pt I rooled out of bed and I think I hit my head ems reports asa 81mg lft shoulder splited pt c/o lft hip and shoulder pain md morocho called to bedside priority ct called pain noted with movement to lft leg hip tender to touch , repeat stimulation needed upon arrival

## 2018-12-29 NOTE — ED ADULT NURSE NOTE - OBJECTIVE STATEMENT
unable to obtain, pt w/ dementia, bruising noted to face, no bleeding/deformities, initially c/o knee pain, left arm in sling by EMS

## 2018-12-29 NOTE — ED PROVIDER NOTE - NS_ ATTENDINGSCRIBEDETAILS _ED_A_ED_FT
I, Cornel Miranda, performed the initial face to face bedside interview with this patient regarding history of present illness, review of symptoms and relevant past medical, social and family history.  I completed an independent physical examination.    The history, relevant review of systems, past medical and surgical history, medical decision making, and physical examination was documented by the scribe in my presence and I attest to the accuracy of the documentation.

## 2018-12-29 NOTE — ED ADULT NURSE NOTE - CHIEF COMPLAINT QUOTE
pt with hx of dementia d/c from inpatient [facility]  stiches present to forehead with brusing all stages as per pt I rooled out of bed and I think I hit my head ems reports asa 81mg lft shoulder splited pt c/o lft hip and shoulder pain md morocho called to bedside priority ct called pain noted with movement to lft leg hip tender to touch , repeat stimulation needed upon arrival

## 2018-12-29 NOTE — ED ADULT NURSE REASSESSMENT NOTE - NS ED NURSE REASSESS COMMENT FT1
Patient discharged back to [facility], report given [facility] from ambulance, patient is a 2 person assist-made aware.

## 2018-12-29 NOTE — ED PROVIDER NOTE - ENMT, MLM
Airway patent, Nasal mucosa clear. Mouth with normal mucosa. NC AT, neck supple, FROM, no cervical tenderness.

## 2018-12-29 NOTE — ED PROVIDER NOTE - CARE PLAN
Principal Discharge DX:	Injury of head, initial encounter  Secondary Diagnosis:	Acute hip pain, unspecified laterality

## 2018-12-29 NOTE — ED ADULT NURSE NOTE - NSIMPLEMENTINTERV_GEN_ALL_ED
Implemented All Fall with Harm Risk Interventions:  [place] to call system. Call bell, personal items and telephone within reach. Instruct patient to call for assistance. Room bathroom lighting operational. Non-slip footwear when patient is off stretcher. Physically safe environment: no spills, clutter or unnecessary equipment. Stretcher in lowest position, wheels locked, appropriate side rails in place. Provide visual cue, wrist band, yellow gown, etc. Monitor gait and stability. Monitor for mental status changes and reorient to person, place, and time. Review medications for side effects contributing to fall risk. Reinforce activity limits and safety measures with patient and family. Provide visual clues: red socks.

## 2018-12-29 NOTE — ED PROVIDER NOTE - OBJECTIVE STATEMENT
89 y/o F pt with PMHx HLD, HTN presents to the ED BIBA from rehab facility c/o hip and knee pain s/p 2 falls today.  Per EMS, pt had 2 unwitnessed falls at her rehab facility today.  Pt states she fell out of bed.  EMS reports pt was admitted to [facility] several days ago for frequent falls at home, and was discharged today to a rehab facility.  Since discharge, she has had 2 falls.  Per EMS, pt is c/o pain on inspiration, and pt is reporting pain to her hip and knee.  Denies fever, vomiting, cough, SOB, CP, HA.

## 2018-12-30 PROBLEM — E78.5 HYPERLIPIDEMIA, UNSPECIFIED: Chronic | Status: ACTIVE | Noted: 2018-12-25

## 2018-12-30 PROBLEM — I10 ESSENTIAL (PRIMARY) HYPERTENSION: Chronic | Status: ACTIVE | Noted: 2018-12-25

## 2020-10-19 NOTE — ED PROVIDER NOTE - NEUROLOGICAL, MLM
"Anesthesia Release from PACU Note    Patient: Paras Smith    Procedure(s) Performed: Procedure(s) (LRB):  TYMPANOPLASTY (Right)    Anesthesia type: GEN    Post pain: Adequate analgesia reported    Post assessment: no apparent anesthetic complications, tolerated procedure well and no evidence of recall    Post vital signs: BP (!) 110/57 (BP Location: Right arm, Patient Position: Lying)   Pulse 68   Temp 36.3 °C (97.4 °F) (Oral)   Resp 16   Ht 5' 2" (1.575 m)   Wt 127 kg (280 lb)   LMP 04/12/2014   SpO2 97%   Breastfeeding No   BMI 51.21 kg/m²     Level of consciousness: awake, alert and oriented    Nausea/Vomiting: no nausea/no vomiting    Complications: none    Airway Patency: patent    Respiratory: unassisted, spontaneous ventilation, room air    Cardiovascular: stable and blood pressure at baseline    Hydration: euvolemic    " Alert and oriented to person, slow to respond.

## 2023-04-26 NOTE — ED ADULT NURSE NOTE - AS SC BRADEN NUTRITION
(3) adequate Complex Repair And Epidermal Autograft Text: The defect edges were debeveled with a #15 scalpel blade.  The primary defect was closed partially with a complex linear closure.  Given the location of the defect, shape of the defect and the proximity to free margins an epidermal autograft was deemed most appropriate to repair the remaining defect.  The graft was trimmed to fit the size of the remaining defect.  The graft was then placed in the primary defect, oriented appropriately, and sutured into place.

## 2023-05-25 NOTE — DISCHARGE NOTE ADULT - SECONDARY DIAGNOSIS.
Letter sent to schedule wcc Frequent falls Delirium superimposed on dementia Essential hypertension Debility

## 2023-12-09 NOTE — H&P ADULT - ASSESSMENT
The patient is a 89 year old female with a history of dementia, hypertension, hyperlipidemia and Paget's disease who was brought to the ER by her family after a fall at home. According to the patient's daughter she found her laying on the floor unclear of what happened or how she fell. She ambulates at baseline with a walker. She has been progressively more confused, weak and falling more often in the past few weeks. In the ER, ct head and c spine were negative. Xray of bilaterl hips, femur and knees were negative for fracture. Chest xray and UA negative for infection. Evaluated by PT, admitted for placement to [facility].     Assessment/Plan:    1. Frequent falls secondary to osteoarthritis and gait instability?: Evaluated by PT, will need PATRICK placement  Fall precautions    2. Hypertension: Continue losartan. Monitor bp    3. Hyperlipidemia: Continue statin therapy    4. Dementia    VTE- lovenox subcut    Plan discussed in detail with the patient's daughter Bren Lion.
Walking

## 2024-02-12 NOTE — PROGRESS NOTE ADULT - SUBJECTIVE AND OBJECTIVE BOX
Patient presents for surveillance colonoscopy. Personal history of colon polyps. Fam hx of CRC (mother). No bowel complaints.  Also, had EGD in 2016 w/dilation, had Schatki's ring. 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 03/2019 had dilation. Bxp from [facility] negative for Almonte's.    Since then, has stayed on
seen for dementia, falls  ros limited but negative due to dementia    agitation overnight, 1:1 restarted, 2mg total haldol given with some effect     MEDICATIONS  (STANDING):  aspirin enteric coated 81 milliGRAM(s) Oral daily  BACItracin   Ointment 1 Application(s) Topical daily  celecoxib 100 milliGRAM(s) Oral at bedtime  cholecalciferol 1000 Unit(s) Oral daily  enoxaparin Injectable 40 milliGRAM(s) SubCutaneous every 24 hours  losartan 100 milliGRAM(s) Oral daily  nystatin Cream 1 Application(s) Topical two times a day  pravastatin 10 milliGRAM(s) Oral at bedtime  risperiDONE   Tablet 0.25 milliGRAM(s) Oral at bedtime    MEDICATIONS  (PRN):  acetaminophen   Tablet .. 650 milliGRAM(s) Oral every 6 hours PRN Temp greater or equal to 38C (100.4F), Mild Pain (1 - 3), Moderate Pain (4 - 6)  haloperidol    Injectable 2 milliGRAM(s) IntraMuscular every 8 hours PRN severe agitation  lidocaine   Patch 2 Patch Transdermal daily PRN pain  melatonin 3 milliGRAM(s) Oral at bedtime PRN Insomnia  naproxen 500 milliGRAM(s) Oral two times a day PRN Moderate Pain (4 - 6)      Allergies    No Known Allergies    Intolerances      Vital Signs Last 24 Hrs  T(C): 36.8 (28 Dec 2018 09:52), Max: 36.9 (28 Dec 2018 04:52)  T(F): 98.3 (28 Dec 2018 09:52), Max: 98.5 (28 Dec 2018 04:52)  HR: 92 (28 Dec 2018 09:52) (89 - 103)  BP: 131/64 (28 Dec 2018 09:52) (126/63 - 164/49)  BP(mean): --  RR: 20 (28 Dec 2018 09:52) (18 - 20)  SpO2: 94% (28 Dec 2018 09:52) (94% - 96%)    PHYSICAL EXAM:    GENERAL: NAD  CHEST/LUNG: Clear to percussion bilaterally  HEART: Regular rate and rhythm; S1 S2;  ABDOMEN: Soft, , Nondistended; Bowel sounds present  EXTREMITIES no edema  NERVOUS SYSTEM:  alert responds appropriately    LABS:                        11.8   8.6   )-----------( 311      ( 27 Dec 2018 08:17 )             36.2     12-27    138  |  102  |  33.0<H>  ----------------------------<  115  4.0   |  23.0  |  0.74    Ca    8.7      27 Dec 2018 08:17            CAPILLARY BLOOD GLUCOSE            RADIOLOGY & ADDITIONAL TESTS:
seen for frequent falls    no acute events/complaints  patient with impulsiveness and trying to ambulate without assistance  ros otherwise negative.    MEDICATIONS  (STANDING):  aspirin enteric coated 81 milliGRAM(s) Oral daily  atorvastatin 10 milliGRAM(s) Oral at bedtime  enoxaparin Injectable 40 milliGRAM(s) SubCutaneous every 24 hours  losartan 100 milliGRAM(s) Oral daily    MEDICATIONS  (PRN):  acetaminophen   Tablet .. 650 milliGRAM(s) Oral every 6 hours PRN Moderate Pain (4 - 6)      Allergies    No Known Allergies    Vital Signs Last 24 Hrs  T(C): 36.2 (26 Dec 2018 06:27), Max: 36.7 (25 Dec 2018 12:40)  T(F): 97.2 (26 Dec 2018 06:27), Max: 98 (25 Dec 2018 12:40)  HR: 96 (26 Dec 2018 06:27) (73 - 96)  BP: 136/90 (26 Dec 2018 06:27) (129/72 - 165/65)  BP(mean): --  RR: 18 (26 Dec 2018 06:27) (18 - 18)  SpO2: 96% (26 Dec 2018 06:27) (92% - 100%)    PHYSICAL EXAM:    GENERAL: NAD in chair.  forehead sutures  CHEST/LUNG: Clear to percussion bilaterally  HEART: Regular rate and rhythm; S1 S2  ABDOMEN: Soft, Nontender, Nondistended; Bowel sounds present  EXTREMITIES:  no edema  NERVOUS SYSTEM:  Alert & Oriented X1 self (recognizes daughter), nonfocal neuro      LABS:                        11.8   7.3   )-----------( 309      ( 26 Dec 2018 09:10 )             37.1     12-    141  |  104  |  28.0<H>  ----------------------------<  108  3.8   |  24.0  |  0.64    Ca    9.3      25 Dec 2018 07:59    TPro  7.2  /  Alb  4.2  /  TBili  0.3<L>  /  DBili  x   /  AST  22  /  ALT  16  /  AlkPhos  86  12-25    PT/INR - ( 25 Dec 2018 07:59 )   PT: 10.7 sec;   INR: 0.93 ratio         PTT - ( 25 Dec 2018 07:59 )  PTT:25.1 sec  Urinalysis Basic - ( 25 Dec 2018 15:21 )    Color: Yellow / Appearance: Slightly Turbid / S.010 / pH: x  Gluc: x / Ketone: Negative  / Bili: Negative / Urobili: Negative mg/dL   Blood: x / Protein: 15 mg/dL / Nitrite: Negative   Leuk Esterase: Trace / RBC: 0-2 /HPF / WBC 0-2   Sq Epi: x / Non Sq Epi: Moderate / Bacteria: Few        CAPILLARY BLOOD GLUCOSE            RADIOLOGY & ADDITIONAL TESTS:
seen for dementia, falls    no events.  complaining of knee pain L >R, diffuse body aches with movement.  ros limited due to dementia but negative on cp/sob.  poor sleep overnight.  impulsive with 1:1    MEDICATIONS  (STANDING):  aspirin enteric coated 81 milliGRAM(s) Oral daily  BACItracin   Ointment 1 Application(s) Topical daily  celecoxib 100 milliGRAM(s) Oral at bedtime  cholecalciferol 1000 Unit(s) Oral daily  enoxaparin Injectable 40 milliGRAM(s) SubCutaneous every 24 hours  losartan 100 milliGRAM(s) Oral daily  nystatin Cream 1 Application(s) Topical two times a day  pravastatin 10 milliGRAM(s) Oral at bedtime  sodium chloride 0.9%. 500 milliLiter(s) (60 mL/Hr) IV Continuous <Continuous>    MEDICATIONS  (PRN):  acetaminophen   Tablet .. 650 milliGRAM(s) Oral every 6 hours PRN Temp greater or equal to 38C (100.4F), Mild Pain (1 - 3), Moderate Pain (4 - 6)  lidocaine   Patch 2 Patch Transdermal daily PRN pain  melatonin 3 milliGRAM(s) Oral at bedtime PRN Insomnia      Allergies    No Known Allergies    Vital Signs Last 24 Hrs  T(C): 36.7 (27 Dec 2018 08:25), Max: 36.9 (27 Dec 2018 06:21)  T(F): 98.1 (27 Dec 2018 08:25), Max: 98.5 (27 Dec 2018 06:21)  HR: 83 (27 Dec 2018 08:25) (83 - 103)  BP: 129/73 (27 Dec 2018 08:25) (115/70 - 132/60)  BP(mean): --  RR: 19 (27 Dec 2018 08:25) (18 - 19)  SpO2: 99% (27 Dec 2018 08:25) (97% - 99%)    PHYSICAL EXAM:    GENERAL: NAD  CHEST/LUNG: Clear to percussion bilaterall  HEART: Regular rate and rhythm; S1 S2  ABDOMEN: Soft, Nontender, Nondistended; Bowel sounds present  EXTREMITIES: no edema, no knee swelling noted b/l   NERVOUS SYSTEM:  Alert & Oriented X2 (self/place). nonfocal neuro..    LABS:                        11.8   8.6   )-----------( 311      ( 27 Dec 2018 08:17 )             36.2         138  |  102  |  33.0<H>  ----------------------------<  115  4.0   |  23.0  |  0.74    Ca    8.7      27 Dec 2018 08:17        Urinalysis Basic - ( 25 Dec 2018 15:21 )    Color: Yellow / Appearance: Slightly Turbid / S.010 / pH: x  Gluc: x / Ketone: Negative  / Bili: Negative / Urobili: Negative mg/dL   Blood: x / Protein: 15 mg/dL / Nitrite: Negative   Leuk Esterase: Trace / RBC: 0-2 /HPF / WBC 0-2   Sq Epi: x / Non Sq Epi: Moderate / Bacteria: Few        CAPILLARY BLOOD GLUCOSE            RADIOLOGY & ADDITIONAL TESTS:

## 2024-04-22 NOTE — INPATIENT CERTIFICATION FOR MEDICARE PATIENTS - CURRENT MEDICAL NEEDS AND CARE PLANS
Pt is a 70 yo female admitted for abdominal pain.  She was diagnosed with acute appendicitis with perforation.  Pt lives alone.  Pt currently on IV Zosyn.  Pt expected to need outpt IV ABX.  She is scheduled to get a PICC line tomorrow.  SW to work on getting outpt IV ABX set for pt - will need final IV ABX orders.     04/22/24 1400   CM/SW Referral Data   Referral Source Physician   Reason for Referral Discharge planning   Informant Clinical Staff Member   Patient Info   Patient's Home Environment House   Patient lives with Alone   Discharge Needs   Anticipated D/C needs Infusion care        Possible Skilled Nursing Facilty (SNF)
